# Patient Record
Sex: MALE | Race: BLACK OR AFRICAN AMERICAN | NOT HISPANIC OR LATINO | Employment: UNEMPLOYED | URBAN - METROPOLITAN AREA
[De-identification: names, ages, dates, MRNs, and addresses within clinical notes are randomized per-mention and may not be internally consistent; named-entity substitution may affect disease eponyms.]

---

## 2022-02-07 ENCOUNTER — LAB VISIT (OUTPATIENT)
Dept: LAB | Facility: HOSPITAL | Age: 42
End: 2022-02-07
Attending: FAMILY MEDICINE
Payer: MEDICAID

## 2022-02-07 ENCOUNTER — OFFICE VISIT (OUTPATIENT)
Dept: PRIMARY CARE CLINIC | Facility: CLINIC | Age: 42
End: 2022-02-07
Payer: MEDICAID

## 2022-02-07 VITALS
HEIGHT: 71 IN | BODY MASS INDEX: 22.99 KG/M2 | DIASTOLIC BLOOD PRESSURE: 83 MMHG | HEART RATE: 90 BPM | SYSTOLIC BLOOD PRESSURE: 129 MMHG | WEIGHT: 164.19 LBS | TEMPERATURE: 98 F | OXYGEN SATURATION: 98 %

## 2022-02-07 DIAGNOSIS — R73.03 PREDIABETES: Primary | ICD-10-CM

## 2022-02-07 DIAGNOSIS — F31.9 BIPOLAR I DISORDER WITH DEPRESSION: ICD-10-CM

## 2022-02-07 DIAGNOSIS — Z11.59 ENCOUNTER FOR HEPATITIS C SCREENING TEST FOR LOW RISK PATIENT: ICD-10-CM

## 2022-02-07 DIAGNOSIS — Z11.4 ENCOUNTER FOR SCREENING FOR HIV: ICD-10-CM

## 2022-02-07 DIAGNOSIS — R73.03 PREDIABETES: ICD-10-CM

## 2022-02-07 DIAGNOSIS — Z79.899 ON LONG TERM DRUG THERAPY: ICD-10-CM

## 2022-02-07 DIAGNOSIS — F17.200 NICOTINE DEPENDENCE WITH CURRENT USE: ICD-10-CM

## 2022-02-07 PROCEDURE — 99999 PR PBB SHADOW E&M-NEW PATIENT-LVL III: ICD-10-PCS | Mod: PBBFAC,,, | Performed by: FAMILY MEDICINE

## 2022-02-07 PROCEDURE — 1159F PR MEDICATION LIST DOCUMENTED IN MEDICAL RECORD: ICD-10-PCS | Mod: CPTII,,, | Performed by: FAMILY MEDICINE

## 2022-02-07 PROCEDURE — 3008F PR BODY MASS INDEX (BMI) DOCUMENTED: ICD-10-PCS | Mod: CPTII,,, | Performed by: FAMILY MEDICINE

## 2022-02-07 PROCEDURE — 99203 OFFICE O/P NEW LOW 30 MIN: CPT | Mod: PBBFAC,PN | Performed by: FAMILY MEDICINE

## 2022-02-07 PROCEDURE — 3008F BODY MASS INDEX DOCD: CPT | Mod: CPTII,,, | Performed by: FAMILY MEDICINE

## 2022-02-07 PROCEDURE — 1159F MED LIST DOCD IN RCRD: CPT | Mod: CPTII,,, | Performed by: FAMILY MEDICINE

## 2022-02-07 PROCEDURE — 99204 OFFICE O/P NEW MOD 45 MIN: CPT | Mod: S$PBB,,, | Performed by: FAMILY MEDICINE

## 2022-02-07 PROCEDURE — 3079F PR MOST RECENT DIASTOLIC BLOOD PRESSURE 80-89 MM HG: ICD-10-PCS | Mod: CPTII,,, | Performed by: FAMILY MEDICINE

## 2022-02-07 PROCEDURE — 99204 PR OFFICE/OUTPT VISIT, NEW, LEVL IV, 45-59 MIN: ICD-10-PCS | Mod: S$PBB,,, | Performed by: FAMILY MEDICINE

## 2022-02-07 PROCEDURE — 99999 PR PBB SHADOW E&M-NEW PATIENT-LVL III: CPT | Mod: PBBFAC,,, | Performed by: FAMILY MEDICINE

## 2022-02-07 PROCEDURE — 3079F DIAST BP 80-89 MM HG: CPT | Mod: CPTII,,, | Performed by: FAMILY MEDICINE

## 2022-02-07 PROCEDURE — 3074F PR MOST RECENT SYSTOLIC BLOOD PRESSURE < 130 MM HG: ICD-10-PCS | Mod: CPTII,,, | Performed by: FAMILY MEDICINE

## 2022-02-07 PROCEDURE — 3074F SYST BP LT 130 MM HG: CPT | Mod: CPTII,,, | Performed by: FAMILY MEDICINE

## 2022-02-07 RX ORDER — QUETIAPINE FUMARATE 400 MG/1
400 TABLET, FILM COATED ORAL NIGHTLY
COMMUNITY

## 2022-02-07 NOTE — PATIENT INSTRUCTIONS
Patient Education       Yearly Physical for Adults   About this topic   Most people do not want to be sick. Having a checkup each year with your doctor is one way to help you stay healthy. You may need to see your doctor more or less often. How often you need to go to the doctor depends on your age. Your family and medical history also play a role in how often you need to go to the doctor. Going to see your doctor on a routine basis can help you find problems early or even before they start. This may make it easier to treat or cure your problem.  General   Your doctor will talk about many things during your checkup. Your doctor may ask about:  · Your medical and family history.  · All the drugs you are taking. Be sure to include all prescription, over the counter, and herbal supplements. Tell the doctor if you have any drug allergy. Bring a list of drugs you take with you.  · How you are feeling and if you are having any problems.  · Risky behaviors like smoking, drinking alcohol, using illegal drugs, not wearing seatbelts, having unprotected sex, etc.  Your doctor will do a physical exam and may check your:  · Height and weight  · Blood pressure  · Reflexes  · Memory  · Vision  · Hearing  Your doctor may order:  · Lab tests  · ECG to check your heart rhythm  · X-rays  · Tests or treatments based on your exam  What lifestyle changes are needed?   Your doctor may suggest you make changes to your lifestyle at this visit. The doctor may talk with you about being more active or lowering stress levels. Ask your doctor what you need to do.  What drugs may be needed?   Your doctor may order drugs or vaccines to protect you from illnesses.  What changes to diet are needed?   Talk to your doctor to see if any changes are needed to your diet.  When do I need to call the doctor?   Call your doctor if you need to learn about any test results. Together you can make a plan for more care.  Helpful tips   · Make a list of questions  for your doctor before you go. This will help you remember to ask about any concerns. Write down any answers from your doctor so you can look over them after your visit.   · Tell your doctor about any changes in your body or health since your last visit.  · Ask your doctor about any screening tests you need.  Where can I learn more?   American Academy of Family Physicians  http://familydoctor.org/familydoctor/en/prevention-wellness/staying-healthy/healthy-living/preventive-services-for-healthy-living.printerview.html   Centers for Disease Control  http://www.cdc.gov/family/checkup/   Last Reviewed Date   2019-04-22  Consumer Information Use and Disclaimer   This information is not specific medical advice and does not replace information you receive from your health care provider. This is only a brief summary of general information. It does NOT include all information about conditions, illnesses, injuries, tests, procedures, treatments, therapies, discharge instructions or life-style choices that may apply to you. You must talk with your health care provider for complete information about your health and treatment options. This information should not be used to decide whether or not to accept your health care providers advice, instructions or recommendations. Only your health care provider has the knowledge and training to provide advice that is right for you.  Copyright   Copyright © 2021 UpToDate, Inc. and its affiliates and/or licensors. All rights reserved.  Patient Education       Prediabetes Discharge Instructions   About this topic   When you eat, your body breaks down all sugars and starches into glucose. Your body needs insulin to use glucose for energy. The insulin takes the glucose from your blood into your cells. Your body may not have enough insulin or your body may not use your insulin properly. Then, the glucose or sugar stays in your blood instead of going into your cells. This causes your blood  sugar levels to be too high.  Prediabetes happens when there is too much sugar in your blood. Your blood sugar is higher than normal but is not high enough to be called diabetes.  When you have diabetes, you have too much sugar in your blood and:  · The cells can no longer respond to insulin. Then, sugar enters the cells too slowly. This is how type 2 diabetes develops.  · The pancreas does not make enough insulin. The sugar cannot get into your cells. This is how type 1 diabetes develops.  Prediabetes happens when there is too much sugar in your blood. Your blood sugar is higher than normal but is not high enough to be called diabetes.  Over time, having too much sugar in your blood can cause serious problems. You may not feel sick, but the high sugar can damage your nerves, kidneys, and eyes. You may also have heart problems or stroke. It is important to treat prediabetes. Once you have diabetes, it does not go away.  What care is needed at home?   · Ask your doctor what you need to do when you go home. Make sure you ask questions if you do not understand what the doctor says. This way you will know what you need to do.  · If you are overweight, losing just 10 to 15 pounds (4.5 to 7 kg) can improve your blood sugar.  · Increase your activity level by:  ? Taking a walk each day. Try a brisk walk for 30 minutes.  ? Taking the stairs instead of the elevator.  ? When doing errands, park your car at the back of the parking lot.  · Quit smoking. If you have problems quitting, ask your doctor for help.  · Learn how to check and control your blood sugar and blood pressure. Ask your doctor what blood sugar is normal for you.  What follow-up care is needed?   · Your doctor may ask you to make visits to the office to check on your progress. Be sure to keep these visits.  · Keep a record of your blood sugars and bring it to your doctor's visit.  What drugs may be needed?   The doctor may order drugs to:  · Lower your blood  sugar  · Control high blood pressure  · Control your cholesterol level  What changes to diet are needed?   · Eat healthy foods and change your eating habits. This means you should:  ? Limit the amount of the high fatty or high sugar foods you eat  ? Use olive oil instead of vegetable oil or butter  ? Add fish in your meals 2 to 3 times a week  ? Eat foods high in fiber, like fruits, vegetables, whole grains, and nuts  ? Eat nonstarchy vegetables, like spinach, carrots, broccoli, and green beans  · Drink water instead of sugar-filled drinks like soda, sports drinks, or fruit drinks.  · Limit or avoid beer, wine, and mixed drinks (alcohol).  What problems could happen?   · High levels of acids called ketones build up in the blood. This is a very serious problem.  · Infection  · Injury to blood vessels and nerves  · Diabetes  · Problems with your heart, kidneys, and eyes  · Stroke  What can be done to prevent this health problem?   · Talk with your doctor about changes in your diet and exercises that are right for you.  · Control your weight.  · Know your blood sugar goals. Check your blood sugar levels at regular times of the day. Keep notes on foods and drinks that increase your blood sugar.   · Know the signs of high blood sugar and get help when needed.  When do I need to call the doctor?   · Signs of high blood sugar. These include sleepiness, blurry eyesight, passing urine more often, increased thirst, breath has a fruity sweet smell, upset stomach and throwing up, dizziness, or passing out.  · Signs of fluid loss. These include dark-colored urine or no urine for more than 8 hours, dry mouth and tongue, dry skin, sunken eyes, lack of energy, feeling faint, or passing out.  · Blood sugar levels are high for more than 2 days  · Short of breath  · You are not feeling better in 2 to 3 days you are feeling worse  Teach Back: Helping You Understand   The Teach Back Method helps you understand the information we are  giving you. After you talk with the staff, tell them in your own words what you learned. This helps to make sure the staff has described each thing clearly. It also helps to explain things that may have been confusing. Before going home, make sure you can do these:  · I can tell you about my condition.  · I can tell you what changes I need to make with my diet or activities.  · I can tell you what I will do if I have signs of high blood sugar.  Where can I learn more?   American Academy of Nutrition and Dietetics  https://www.eatright.org/health/diseases-and-conditions/diabetes/what-is-prediabetes   Center for Disease Control and Prevention  https://www.cdc.gov/diabetes/basics/prediabetes.html   National Diabetes Information Clearinghouse  https://www.niddk.nih.gov/health-information/diabetes/overview/what-is-diabetes/prediabetes-insulin-resistance   Last Reviewed Date   2021-04-28  Consumer Information Use and Disclaimer   This information is not specific medical advice and does not replace information you receive from your health care provider. This is only a brief summary of general information. It does NOT include all information about conditions, illnesses, injuries, tests, procedures, treatments, therapies, discharge instructions or life-style choices that may apply to you. You must talk with your health care provider for complete information about your health and treatment options. This information should not be used to decide whether or not to accept your health care providers advice, instructions or recommendations. Only your health care provider has the knowledge and training to provide advice that is right for you.  Copyright   Copyright © 2021 UpToDate, Inc. and its affiliates and/or licensors. All rights reserved.    Maintain  a heathy lifestyle to prevent diabetes.  Choose a diet rich in fruits, vegetables, and low-fat dairy products, but low in meats, sweets, and refined grains   Be more active. If  you are able, walk for 35 mins 5 times a week.

## 2022-02-07 NOTE — PROGRESS NOTES
Subjective:       Patient ID: Man Ochoa is a 42 y.o. male.    Chief Complaint: Establish Care, Abnormal lab      HPI   History of Present Illness:   Man Ochoa 42 y.o. male presents today with   Here to establish care.  [facility]nd [facility]-where his lipids showed LDL of 131 and AIC was 5.9 to follow up with pcp.  Denies any sxs.  He goes there for his Bipolar1 d/o with  Depression, and anxiety.   Knows that he takes Seroquel, and will make the rest of the list available when he gets home.   Smokes 1ppd.     Past Medical History:   Diagnosis Date    Anxiety     Bipolar disorder     Depression      No family history on file.  Social History     Socioeconomic History    Marital status:    Tobacco Use    Smoking status: Current Every Day Smoker    Smokeless tobacco: Never Used   Substance and Sexual Activity    Alcohol use: Never    Drug use: Yes     Types: Marijuana     Outpatient Encounter Medications as of 2/7/2022   Medication Sig Dispense Refill    QUEtiapine (SEROQUEL) 400 MG tablet Take 400 mg by mouth every evening.       No facility-administered encounter medications on file as of 2/7/2022.       Review of Systems   Constitutional: Negative for appetite change and fever.   HENT: Negative for congestion, facial swelling and voice change.    Eyes: Negative for discharge and itching.   Respiratory: Negative for cough, chest tightness and wheezing.    Cardiovascular: Negative.  Negative for chest pain and leg swelling.   Gastrointestinal: Negative for abdominal pain, nausea and vomiting.   Endocrine: Negative for cold intolerance and heat intolerance.   Genitourinary: Negative for dysuria and flank pain.   Musculoskeletal: Negative for myalgias and neck stiffness.   Skin: Negative for pallor and rash.   Neurological: Negative for facial asymmetry and weakness.   Psychiatric/Behavioral: Negative for agitation and confusion.       Objective:     "  /83 (BP Location: Right arm, Patient Position: Sitting, BP Method: Medium (Automatic))   Pulse 90   Temp 98.4 °F (36.9 °C) (Temporal)   Ht 5' 11" (1.803 m)   Wt 74.5 kg (164 lb 3.2 oz)   SpO2 98%   BMI 22.90 kg/m²   Physical Exam  Vitals and nursing note reviewed.   Constitutional:       General: He is not in acute distress.     Appearance: He is well-developed.   HENT:      Head: Normocephalic and atraumatic.      Right Ear: External ear normal.      Left Ear: External ear normal.   Eyes:      Extraocular Movements: EOM normal.      Conjunctiva/sclera: Conjunctivae normal.   Neck:      Thyroid: No thyromegaly.   Cardiovascular:      Rate and Rhythm: Normal rate and regular rhythm.      Heart sounds: Murmur heard.       Pulmonary:      Effort: Pulmonary effort is normal. No respiratory distress.   Abdominal:      General: Bowel sounds are normal. There is no distension.      Palpations: Abdomen is soft.      Tenderness: There is no abdominal tenderness.   Genitourinary:     Comments: deferred  Musculoskeletal:         General: No deformity or edema.      Cervical back: Neck supple.   Lymphadenopathy:      Head:      Right side of head: No submandibular adenopathy.      Left side of head: No submandibular adenopathy.      Cervical: No cervical adenopathy.   Skin:     General: Skin is warm and dry.   Neurological:      Mental Status: He is alert and oriented to person, place, and time.   Psychiatric:         Mood and Affect: Mood and affect normal.         Behavior: Behavior normal.         No results found for this or any previous visit.  Assessment:       1. Prediabetes    2. Bipolar I disorder with depression    3. Encounter for hepatitis C screening test for low risk patient    4. Encounter for screening for HIV    5. Nicotine dependence with current use    6. On long term drug therapy        Plan:   Prediabetes  -     Hemoglobin A1C; Future; Expected date: 08/06/2022    Bipolar I disorder with " depression  -     CBC Auto Differential; Future; Expected date: 02/07/2022  -     Comprehensive Metabolic Panel; Future; Expected date: 02/07/2022    Encounter for hepatitis C screening test for low risk patient  -     Hepatitis C Antibody; Future; Expected date: 02/07/2022    Encounter for screening for HIV  -     HIV 1/2 Ag/Ab (4th Gen); Future; Expected date: 02/07/2022    Nicotine dependence with current use  -     Ambulatory referral/consult to Smoking Cessation Program; Future; Expected date: 02/14/2022    On long term drug therapy  -     TSH; Future; Expected date: 02/07/2022    will try 6 mons of intensive lifestyle modification before initiating med.  High risk for DM due to medication  Diabetic package given to pt today  Maintain a heathy lifestyle.  Choose a diet rich in fruits, vegetables, and low-fat dairy products, but low in meats, sweets, and refined grains   Be more active. If you are able, walk for 35 mins 5 times a week.     Linda Barakat MD

## 2022-02-08 ENCOUNTER — HOSPITAL ENCOUNTER (EMERGENCY)
Facility: HOSPITAL | Age: 42
Discharge: LEFT BEFORE DISCHARGE | End: 2022-02-08
Attending: EMERGENCY MEDICINE
Payer: MEDICAID

## 2022-02-08 ENCOUNTER — TELEPHONE (OUTPATIENT)
Dept: PRIMARY CARE CLINIC | Facility: CLINIC | Age: 42
End: 2022-02-08
Payer: MEDICAID

## 2022-02-08 VITALS
DIASTOLIC BLOOD PRESSURE: 79 MMHG | SYSTOLIC BLOOD PRESSURE: 125 MMHG | WEIGHT: 164.88 LBS | BODY MASS INDEX: 23.08 KG/M2 | OXYGEN SATURATION: 100 % | HEIGHT: 71 IN | HEART RATE: 70 BPM | TEMPERATURE: 98 F | RESPIRATION RATE: 20 BRPM

## 2022-02-08 DIAGNOSIS — Z53.21 ELOPED FROM EMERGENCY DEPARTMENT: ICD-10-CM

## 2022-02-08 DIAGNOSIS — E87.5 SERUM POTASSIUM ELEVATED: Primary | ICD-10-CM

## 2022-02-08 LAB
ALBUMIN SERPL BCP-MCNC: 3.6 G/DL (ref 3.5–5.2)
ALBUMIN SERPL BCP-MCNC: 4.1 G/DL (ref 3.5–5.2)
ALP SERPL-CCNC: 107 U/L (ref 55–135)
ALP SERPL-CCNC: 97 U/L (ref 55–135)
ALT SERPL W/O P-5'-P-CCNC: 16 U/L (ref 10–44)
ALT SERPL W/O P-5'-P-CCNC: 22 U/L (ref 10–44)
ANION GAP SERPL CALC-SCNC: 10 MMOL/L (ref 8–16)
ANION GAP SERPL CALC-SCNC: 4 MMOL/L (ref 8–16)
AST SERPL-CCNC: 13 U/L (ref 10–40)
AST SERPL-CCNC: 18 U/L (ref 10–40)
BASOPHILS # BLD AUTO: 0.04 K/UL (ref 0–0.2)
BASOPHILS # BLD AUTO: 0.05 K/UL (ref 0–0.2)
BASOPHILS NFR BLD: 0.3 % (ref 0–1.9)
BASOPHILS NFR BLD: 0.4 % (ref 0–1.9)
BILIRUB SERPL-MCNC: 0.3 MG/DL (ref 0.1–1)
BILIRUB SERPL-MCNC: 0.4 MG/DL (ref 0.1–1)
BUN SERPL-MCNC: 12 MG/DL (ref 6–20)
BUN SERPL-MCNC: 14 MG/DL (ref 6–20)
CALCIUM SERPL-MCNC: 8.5 MG/DL (ref 8.7–10.5)
CALCIUM SERPL-MCNC: 9.5 MG/DL (ref 8.7–10.5)
CHLORIDE SERPL-SCNC: 104 MMOL/L (ref 95–110)
CHLORIDE SERPL-SCNC: 104 MMOL/L (ref 95–110)
CO2 SERPL-SCNC: 26 MMOL/L (ref 23–29)
CO2 SERPL-SCNC: 29 MMOL/L (ref 23–29)
CREAT SERPL-MCNC: 0.9 MG/DL (ref 0.5–1.4)
CREAT SERPL-MCNC: 0.9 MG/DL (ref 0.5–1.4)
DIFFERENTIAL METHOD: ABNORMAL
DIFFERENTIAL METHOD: NORMAL
EOSINOPHIL # BLD AUTO: 0.1 K/UL (ref 0–0.5)
EOSINOPHIL # BLD AUTO: 0.3 K/UL (ref 0–0.5)
EOSINOPHIL NFR BLD: 0.6 % (ref 0–8)
EOSINOPHIL NFR BLD: 2.2 % (ref 0–8)
ERYTHROCYTE [DISTWIDTH] IN BLOOD BY AUTOMATED COUNT: 13.2 % (ref 11.5–14.5)
ERYTHROCYTE [DISTWIDTH] IN BLOOD BY AUTOMATED COUNT: 13.2 % (ref 11.5–14.5)
EST. GFR  (AFRICAN AMERICAN): >60 ML/MIN/1.73 M^2
EST. GFR  (AFRICAN AMERICAN): >60 ML/MIN/1.73 M^2
EST. GFR  (NON AFRICAN AMERICAN): >60 ML/MIN/1.73 M^2
EST. GFR  (NON AFRICAN AMERICAN): >60 ML/MIN/1.73 M^2
ESTIMATED AVG GLUCOSE: 111 MG/DL (ref 68–131)
GLUCOSE SERPL-MCNC: 57 MG/DL (ref 70–110)
GLUCOSE SERPL-MCNC: 86 MG/DL (ref 70–110)
HBA1C MFR BLD: 5.5 % (ref 4–5.6)
HCT VFR BLD AUTO: 40.3 % (ref 40–54)
HCT VFR BLD AUTO: 44.6 % (ref 40–54)
HCV AB SERPL QL IA: NEGATIVE
HGB BLD-MCNC: 13.3 G/DL (ref 14–18)
HGB BLD-MCNC: 14.5 G/DL (ref 14–18)
HIV 1+2 AB+HIV1 P24 AG SERPL QL IA: NEGATIVE
IMM GRANULOCYTES # BLD AUTO: 0.03 K/UL (ref 0–0.04)
IMM GRANULOCYTES # BLD AUTO: 0.04 K/UL (ref 0–0.04)
IMM GRANULOCYTES NFR BLD AUTO: 0.2 % (ref 0–0.5)
IMM GRANULOCYTES NFR BLD AUTO: 0.4 % (ref 0–0.5)
LYMPHOCYTES # BLD AUTO: 3.4 K/UL (ref 1–4.8)
LYMPHOCYTES # BLD AUTO: 4.6 K/UL (ref 1–4.8)
LYMPHOCYTES NFR BLD: 29.6 % (ref 18–48)
LYMPHOCYTES NFR BLD: 38.2 % (ref 18–48)
MCH RBC QN AUTO: 29.8 PG (ref 27–31)
MCH RBC QN AUTO: 29.8 PG (ref 27–31)
MCHC RBC AUTO-ENTMCNC: 32.5 G/DL (ref 32–36)
MCHC RBC AUTO-ENTMCNC: 33 G/DL (ref 32–36)
MCV RBC AUTO: 90 FL (ref 82–98)
MCV RBC AUTO: 92 FL (ref 82–98)
MONOCYTES # BLD AUTO: 0.6 K/UL (ref 0.3–1)
MONOCYTES # BLD AUTO: 0.8 K/UL (ref 0.3–1)
MONOCYTES NFR BLD: 5.5 % (ref 4–15)
MONOCYTES NFR BLD: 7 % (ref 4–15)
NEUTROPHILS # BLD AUTO: 6.3 K/UL (ref 1.8–7.7)
NEUTROPHILS # BLD AUTO: 7.2 K/UL (ref 1.8–7.7)
NEUTROPHILS NFR BLD: 52.1 % (ref 38–73)
NEUTROPHILS NFR BLD: 63.5 % (ref 38–73)
NRBC BLD-RTO: 0 /100 WBC
NRBC BLD-RTO: 0 /100 WBC
PLATELET # BLD AUTO: 286 K/UL (ref 150–450)
PLATELET # BLD AUTO: 327 K/UL (ref 150–450)
PMV BLD AUTO: 8.6 FL (ref 9.2–12.9)
PMV BLD AUTO: 9.9 FL (ref 9.2–12.9)
POTASSIUM SERPL-SCNC: 3.7 MMOL/L (ref 3.5–5.1)
POTASSIUM SERPL-SCNC: 7.9 MMOL/L (ref 3.5–5.1)
PROT SERPL-MCNC: 6.6 G/DL (ref 6–8.4)
PROT SERPL-MCNC: 8.1 G/DL (ref 6–8.4)
RBC # BLD AUTO: 4.46 M/UL (ref 4.6–6.2)
RBC # BLD AUTO: 4.87 M/UL (ref 4.6–6.2)
SODIUM SERPL-SCNC: 137 MMOL/L (ref 136–145)
SODIUM SERPL-SCNC: 140 MMOL/L (ref 136–145)
T4 FREE SERPL-MCNC: 1 NG/DL (ref 0.71–1.51)
TSH SERPL DL<=0.005 MIU/L-ACNC: 0.24 UIU/ML (ref 0.4–4)
WBC # BLD AUTO: 11.4 K/UL (ref 3.9–12.7)
WBC # BLD AUTO: 12.04 K/UL (ref 3.9–12.7)

## 2022-02-08 PROCEDURE — 36415 COLL VENOUS BLD VENIPUNCTURE: CPT | Mod: PN | Performed by: FAMILY MEDICINE

## 2022-02-08 PROCEDURE — 80053 COMPREHEN METABOLIC PANEL: CPT | Performed by: FAMILY MEDICINE

## 2022-02-08 PROCEDURE — 93010 EKG 12-LEAD: ICD-10-PCS | Mod: ,,, | Performed by: INTERNAL MEDICINE

## 2022-02-08 PROCEDURE — 99284 EMERGENCY DEPT VISIT MOD MDM: CPT | Mod: 25

## 2022-02-08 PROCEDURE — 83036 HEMOGLOBIN GLYCOSYLATED A1C: CPT | Performed by: FAMILY MEDICINE

## 2022-02-08 PROCEDURE — 84443 ASSAY THYROID STIM HORMONE: CPT | Performed by: FAMILY MEDICINE

## 2022-02-08 PROCEDURE — 80053 COMPREHEN METABOLIC PANEL: CPT | Mod: 91 | Performed by: NURSE PRACTITIONER

## 2022-02-08 PROCEDURE — 87389 HIV-1 AG W/HIV-1&-2 AB AG IA: CPT | Performed by: FAMILY MEDICINE

## 2022-02-08 PROCEDURE — 93010 ELECTROCARDIOGRAM REPORT: CPT | Mod: ,,, | Performed by: INTERNAL MEDICINE

## 2022-02-08 PROCEDURE — 86803 HEPATITIS C AB TEST: CPT | Performed by: FAMILY MEDICINE

## 2022-02-08 PROCEDURE — 85025 COMPLETE CBC W/AUTO DIFF WBC: CPT | Performed by: FAMILY MEDICINE

## 2022-02-08 PROCEDURE — 85025 COMPLETE CBC W/AUTO DIFF WBC: CPT | Mod: 91 | Performed by: NURSE PRACTITIONER

## 2022-02-08 PROCEDURE — 84439 ASSAY OF FREE THYROXINE: CPT | Performed by: FAMILY MEDICINE

## 2022-02-08 PROCEDURE — 93005 ELECTROCARDIOGRAM TRACING: CPT

## 2022-02-08 NOTE — FIRST PROVIDER EVALUATION
Medical screening exam completed.  I have conducted a focused provider triage encounter, findings are as follows:    Brief history of present illness:  Pt sent to the Er for elevated K+    There were no vitals filed for this visit.      Preliminary workup initiated; this workup will be continued and followed by the physician or advanced practice provider that is assigned to the patient when roomed.

## 2022-02-08 NOTE — TELEPHONE ENCOUNTER
Patient returned call to Nurse after lab called and reported a critical Potassium Lab 7.9. Patient was called and encouraged to report to ED immediately. Patient stated that he was reporting to [facility].  ----- Message from aMy Joe sent at 2/8/2022  1:05 PM CST -----  Contact: self [phone]  Patient is returning a phone call.  Who left a message for the patient: nurse  Does patient know what this is regarding:  call earlier  Would you like a call back, or a response through your [facility] portal?:   phone  Comments:

## 2022-02-09 ENCOUNTER — TELEPHONE (OUTPATIENT)
Dept: PRIMARY CARE CLINIC | Facility: CLINIC | Age: 42
End: 2022-02-09
Payer: MEDICAID

## 2022-02-09 NOTE — PROGRESS NOTES
Critical level due to hemolysis. Repeat lab was normal.  If he does not have a follow up appt, please make him one.

## 2022-02-09 NOTE — ED PROVIDER NOTES
"     HISTORY     Chief Complaint   Patient presents with    Abnormal Lab     PT states, "The doctor called and said to come to the ER for my high blood level."     Review of patient's allergies indicates:  No Known Allergies     HPI   Reports he was sent to the ER by his docotor for elevated K+. Pt denies any and all complaints     The history is provided by the patient. No  was used.        PCP: Linda Barakat MD     Past Medical History:  Past Medical History:   Diagnosis Date    Anxiety     Bipolar disorder     Depression         Past Surgical History:  No past surgical history on file.     Family History:  No family history on file.     Social History:  Social History     Tobacco Use    Smoking status: Current Every Day Smoker    Smokeless tobacco: Never Used   Substance and Sexual Activity    Alcohol use: Never    Drug use: Yes     Types: Marijuana    Sexual activity: Not on file         ROS   Review of Systems   Constitutional: Negative for fever.   HENT: Negative for sore throat.    Respiratory: Negative for shortness of breath.    Cardiovascular: Negative for chest pain.   Gastrointestinal: Negative for nausea.   Genitourinary: Negative for dysuria.   Musculoskeletal: Negative for back pain.   Skin: Negative for rash.   Neurological: Negative for weakness.   Hematological: Does not bruise/bleed easily.       PHYSICAL EXAM     Initial Vitals [02/08/22 1748]   BP Pulse Resp Temp SpO2   125/79 70 20 98.3 °F (36.8 °C) 100 %      MAP       --           Physical Exam    Nursing note and vitals reviewed.  Constitutional: He appears well-developed and well-nourished. He is not diaphoretic. No distress.   HENT:   Head: Normocephalic and atraumatic.   Eyes: Right eye exhibits no discharge. Left eye exhibits no discharge.   Neck: Neck supple.   Normal range of motion.  Cardiovascular: Normal rate.   Pulmonary/Chest: No respiratory distress.   Abdominal: Abdomen is soft. He exhibits no " "distension. There is no abdominal tenderness.   Musculoskeletal:         General: Normal range of motion.      Cervical back: Normal range of motion and neck supple.     Neurological: He is alert and oriented to person, place, and time. He has normal strength.   Skin: Skin is warm and dry.          ED COURSE   Procedures  ED ONGOING VITALS:  Vitals:    02/08/22 1748   BP: 125/79   Pulse: 70   Resp: 20   Temp: 98.3 °F (36.8 °C)   TempSrc: Oral   SpO2: 100%   Weight: 74.8 kg (164 lb 14.5 oz)   Height: 5' 11" (1.803 m)         ABNORMAL LAB VALUES:  Labs Reviewed   CBC W/ AUTO DIFFERENTIAL - Abnormal; Notable for the following components:       Result Value    RBC 4.46 (*)     Hemoglobin 13.3 (*)     MPV 8.6 (*)     All other components within normal limits   COMPREHENSIVE METABOLIC PANEL - Abnormal; Notable for the following components:    Calcium 8.5 (*)     All other components within normal limits         ALL LAB VALUES:  Results for orders placed or performed during the hospital encounter of 02/08/22   CBC auto differential   Result Value Ref Range    WBC 12.04 3.90 - 12.70 K/uL    RBC 4.46 (L) 4.60 - 6.20 M/uL    Hemoglobin 13.3 (L) 14.0 - 18.0 g/dL    Hematocrit 40.3 40.0 - 54.0 %    MCV 90 82 - 98 fL    MCH 29.8 27.0 - 31.0 pg    MCHC 33.0 32.0 - 36.0 g/dL    RDW 13.2 11.5 - 14.5 %    Platelets 286 150 - 450 K/uL    MPV 8.6 (L) 9.2 - 12.9 fL    Immature Granulocytes 0.2 0.0 - 0.5 %    Gran # (ANC) 6.3 1.8 - 7.7 K/uL    Immature Grans (Abs) 0.03 0.00 - 0.04 K/uL    Lymph # 4.6 1.0 - 4.8 K/uL    Mono # 0.8 0.3 - 1.0 K/uL    Eos # 0.3 0.0 - 0.5 K/uL    Baso # 0.04 0.00 - 0.20 K/uL    nRBC 0 0 /100 WBC    Gran % 52.1 38.0 - 73.0 %    Lymph % 38.2 18.0 - 48.0 %    Mono % 7.0 4.0 - 15.0 %    Eosinophil % 2.2 0.0 - 8.0 %    Basophil % 0.3 0.0 - 1.9 %    Differential Method Automated    Comprehensive metabolic panel   Result Value Ref Range    Sodium 140 136 - 145 mmol/L    Potassium 3.7 3.5 - 5.1 mmol/L    Chloride 104 " 95 - 110 mmol/L    CO2 26 23 - 29 mmol/L    Glucose 86 70 - 110 mg/dL    BUN 14 6 - 20 mg/dL    Creatinine 0.9 0.5 - 1.4 mg/dL    Calcium 8.5 (L) 8.7 - 10.5 mg/dL    Total Protein 6.6 6.0 - 8.4 g/dL    Albumin 3.6 3.5 - 5.2 g/dL    Total Bilirubin 0.3 0.1 - 1.0 mg/dL    Alkaline Phosphatase 97 55 - 135 U/L    AST 13 10 - 40 U/L    ALT 16 10 - 44 U/L    Anion Gap 10 8 - 16 mmol/L    eGFR if African American >60 >60 mL/min/1.73 m^2    eGFR if non African American >60 >60 mL/min/1.73 m^2           RADIOLOGY STUDIES:  Imaging Results    None                   The above vital signs and test results have been reviewed by the emergency provider.     ED Medications:  Current Discharge Medication List        Discharge Medications:  New Prescriptions    No medications on file       Follow-up Information     Linda Barakat MD.    Specialty: Family Medicine  Why: As needed  Contact information:  [address] [facility]  [address] [place] [id number] [phone]             [facility]    Specialty: Emergency Medicine  Why: If symptoms worsen  Contact information:  [address] [facility] [address] [phone]                      8:27 PM  Pt has eloped from the ER    MEDICAL DECISION MAKING                 CLINICAL IMPRESSION       ICD-10-CM ICD-9-CM   1. Serum potassium elevated  E87.5 276.7   2. Eloped from emergency department  Z53.21 V64.2       Disposition:   Disposition: Eloped         Jose Lorenzo NP  02/08/22 2030

## 2022-02-09 NOTE — TELEPHONE ENCOUNTER
Patient was advised to report to ED due to critical Potassium level on yesterday per wife he went to [facility] and left due to long wait patient has called multiple time asking if he can go to [facility] was advised he can go to any ED he chooses ----- Message from Roseline Laurent sent at 2/9/2022  2:07 PM CST -----  Contact: [phone] Malorie(wife)  Pt wife is requesting a call back from the nurse about the pt going to the ER on 02/08/22. Pt wife is asking if the pt can be seen at [facility] and why the pt was not prescribed any medication. Pt wife states she left a message earlier with no call back. Pt wife was advised the doctor is in clinic with pts and she would receive a call back by the end of the day.

## 2022-09-15 ENCOUNTER — TELEPHONE (OUTPATIENT)
Dept: PRIMARY CARE CLINIC | Facility: CLINIC | Age: 42
End: 2022-09-15
Payer: MEDICAID

## 2022-09-15 NOTE — TELEPHONE ENCOUNTER
Returned call follow hospital appointment scheduled. Spoke to wife she voiced understanding of appointment date time and location.

## 2022-09-29 ENCOUNTER — OFFICE VISIT (OUTPATIENT)
Dept: PRIMARY CARE CLINIC | Facility: CLINIC | Age: 42
End: 2022-09-29
Payer: MEDICAID

## 2022-09-29 VITALS
DIASTOLIC BLOOD PRESSURE: 76 MMHG | HEART RATE: 91 BPM | TEMPERATURE: 98 F | BODY MASS INDEX: 25.17 KG/M2 | HEIGHT: 71 IN | SYSTOLIC BLOOD PRESSURE: 132 MMHG | OXYGEN SATURATION: 97 % | WEIGHT: 179.81 LBS

## 2022-09-29 DIAGNOSIS — R73.03 PREDIABETES: Primary | ICD-10-CM

## 2022-09-29 DIAGNOSIS — R60.0 EDEMA OF BOTH FEET: ICD-10-CM

## 2022-09-29 DIAGNOSIS — F17.200 NICOTINE DEPENDENCE WITH CURRENT USE: ICD-10-CM

## 2022-09-29 DIAGNOSIS — F31.9 BIPOLAR I DISORDER WITH DEPRESSION: ICD-10-CM

## 2022-09-29 PROCEDURE — 99999 PR PBB SHADOW E&M-EST. PATIENT-LVL III: CPT | Mod: PBBFAC,,, | Performed by: FAMILY MEDICINE

## 2022-09-29 PROCEDURE — 99999 PR PBB SHADOW E&M-EST. PATIENT-LVL III: ICD-10-PCS | Mod: PBBFAC,,, | Performed by: FAMILY MEDICINE

## 2022-09-29 PROCEDURE — 3078F DIAST BP <80 MM HG: CPT | Mod: CPTII,,, | Performed by: FAMILY MEDICINE

## 2022-09-29 PROCEDURE — 3044F PR MOST RECENT HEMOGLOBIN A1C LEVEL <7.0%: ICD-10-PCS | Mod: CPTII,,, | Performed by: FAMILY MEDICINE

## 2022-09-29 PROCEDURE — 3008F BODY MASS INDEX DOCD: CPT | Mod: CPTII,,, | Performed by: FAMILY MEDICINE

## 2022-09-29 PROCEDURE — 3044F HG A1C LEVEL LT 7.0%: CPT | Mod: CPTII,,, | Performed by: FAMILY MEDICINE

## 2022-09-29 PROCEDURE — 3075F SYST BP GE 130 - 139MM HG: CPT | Mod: CPTII,,, | Performed by: FAMILY MEDICINE

## 2022-09-29 PROCEDURE — 99213 PR OFFICE/OUTPT VISIT, EST, LEVL III, 20-29 MIN: ICD-10-PCS | Mod: S$PBB,,, | Performed by: FAMILY MEDICINE

## 2022-09-29 PROCEDURE — 99213 OFFICE O/P EST LOW 20 MIN: CPT | Mod: S$PBB,,, | Performed by: FAMILY MEDICINE

## 2022-09-29 PROCEDURE — 1159F MED LIST DOCD IN RCRD: CPT | Mod: CPTII,,, | Performed by: FAMILY MEDICINE

## 2022-09-29 PROCEDURE — 99213 OFFICE O/P EST LOW 20 MIN: CPT | Mod: PBBFAC,PN | Performed by: FAMILY MEDICINE

## 2022-09-29 PROCEDURE — 3008F PR BODY MASS INDEX (BMI) DOCUMENTED: ICD-10-PCS | Mod: CPTII,,, | Performed by: FAMILY MEDICINE

## 2022-09-29 PROCEDURE — 1159F PR MEDICATION LIST DOCUMENTED IN MEDICAL RECORD: ICD-10-PCS | Mod: CPTII,,, | Performed by: FAMILY MEDICINE

## 2022-09-29 PROCEDURE — 3078F PR MOST RECENT DIASTOLIC BLOOD PRESSURE < 80 MM HG: ICD-10-PCS | Mod: CPTII,,, | Performed by: FAMILY MEDICINE

## 2022-09-29 PROCEDURE — 3075F PR MOST RECENT SYSTOLIC BLOOD PRESS GE 130-139MM HG: ICD-10-PCS | Mod: CPTII,,, | Performed by: FAMILY MEDICINE

## 2022-09-29 NOTE — PROGRESS NOTES
Subjective:       Patient ID: Man Ochoa is a 42 y.o. male.    Chief Complaint: No chief complaint on file.      HPI   History of Present Illness:   Man Ochoa 42 y.o. male presents today with  Here for routine 6 mons follow up. No complaints  Was in hospital for bilateral feet edema which has resolved.  On Seroquel for insomnia and it is helping.    Past Medical History:   Diagnosis Date    Anxiety     Bipolar disorder     Depression      No family history on file.  Social History     Socioeconomic History    Marital status:    Tobacco Use    Smoking status: Every Day    Smokeless tobacco: Never   Substance and Sexual Activity    Alcohol use: Never    Drug use: Yes     Types: Marijuana     Outpatient Encounter Medications as of 9/29/2022   Medication Sig Dispense Refill    QUEtiapine (SEROQUEL) 400 MG tablet Take 400 mg by mouth every evening.       No facility-administered encounter medications on file as of 9/29/2022.       Review of Systems    Review of Systems      A complete 10 point ROS was completed and are positive as per above HPI.    Otherwise negative for fever, diplopia, chest pain, shortness of breath, vomiting, blood in urine, joint pain, skin rash, seizures and unusual bleeding.       Objective:      There were no vitals taken for this visit.  Physical Exam    CONSTITUTIONAL: No apparent distress. Appears comfortable. Does not appear acutely ill or septic. Appears adequately hydrated.  CARDIOVASCULAR: No perioral cyanosis  PULMONARY: Breathing unlabored. No retractions Chest expansion grossly normal.  PSYCHIATRIC: Alert and conversant and grossly oriented. Mood is grossly neutral. Affect appropriate. Judgment and insight grossly intact.  NEUROLOGIC: No focal sensory deficits reported.     Results for orders placed or performed during the hospital encounter of 02/08/22   CBC auto differential   Result Value Ref Range    WBC 12.04 3.90 - 12.70 K/uL    RBC 4.46 (L) 4.60 -  6.20 M/uL    Hemoglobin 13.3 (L) 14.0 - 18.0 g/dL    Hematocrit 40.3 40.0 - 54.0 %    MCV 90 82 - 98 fL    MCH 29.8 27.0 - 31.0 pg    MCHC 33.0 32.0 - 36.0 g/dL    RDW 13.2 11.5 - 14.5 %    Platelets 286 150 - 450 K/uL    MPV 8.6 (L) 9.2 - 12.9 fL    Immature Granulocytes 0.2 0.0 - 0.5 %    Gran # (ANC) 6.3 1.8 - 7.7 K/uL    Immature Grans (Abs) 0.03 0.00 - 0.04 K/uL    Lymph # 4.6 1.0 - 4.8 K/uL    Mono # 0.8 0.3 - 1.0 K/uL    Eos # 0.3 0.0 - 0.5 K/uL    Baso # 0.04 0.00 - 0.20 K/uL    nRBC 0 0 /100 WBC    Gran % 52.1 38.0 - 73.0 %    Lymph % 38.2 18.0 - 48.0 %    Mono % 7.0 4.0 - 15.0 %    Eosinophil % 2.2 0.0 - 8.0 %    Basophil % 0.3 0.0 - 1.9 %    Differential Method Automated    Comprehensive metabolic panel   Result Value Ref Range    Sodium 140 136 - 145 mmol/L    Potassium 3.7 3.5 - 5.1 mmol/L    Chloride 104 95 - 110 mmol/L    CO2 26 23 - 29 mmol/L    Glucose 86 70 - 110 mg/dL    BUN 14 6 - 20 mg/dL    Creatinine 0.9 0.5 - 1.4 mg/dL    Calcium 8.5 (L) 8.7 - 10.5 mg/dL    Total Protein 6.6 6.0 - 8.4 g/dL    Albumin 3.6 3.5 - 5.2 g/dL    Total Bilirubin 0.3 0.1 - 1.0 mg/dL    Alkaline Phosphatase 97 55 - 135 U/L    AST 13 10 - 40 U/L    ALT 16 10 - 44 U/L    Anion Gap 10 8 - 16 mmol/L    eGFR if African American >60 >60 mL/min/1.73 m^2    eGFR if non African American >60 >60 mL/min/1.73 m^2     Assessment:       No diagnosis found.    Plan:   There are no diagnoses linked to this encounter.    Linda Barakat MD

## 2022-09-29 NOTE — PROGRESS NOTES
"Subjective:       Patient ID: Man Ochoa is a 42 y.o. male.    Chief Complaint: follow up er visit      HPI   History of Present Illness:   Man Ochoa 42 y.o. male presents today with  Reported that he just got out of work, very sleepy and  wants to let me know that he is doing good and the swelling has resolved completely. Declined exam and will come back for follow up.  Was at the [facility] recently for feet edema.   Chronic Condition, Stable, latest lab result reviewed, medications reviewed-no side effects, counseled to take all meds as prescribed. No change in medication today, continue current regimen.      Past Medical History:   Diagnosis Date    Anxiety     Bipolar disorder     Depression      No family history on file.  Social History     Socioeconomic History    Marital status:    Tobacco Use    Smoking status: Every Day    Smokeless tobacco: Never   Substance and Sexual Activity    Alcohol use: Never    Drug use: Yes     Types: Marijuana     Outpatient Encounter Medications as of 9/29/2022   Medication Sig Dispense Refill    QUEtiapine (SEROQUEL) 400 MG tablet Take 400 mg by mouth every evening.       No facility-administered encounter medications on file as of 9/29/2022.       Review of Systems    Review of Systems      A complete 10 point ROS was completed and are positive as per above HPI.    Otherwise negative for fever, diplopia, chest pain, shortness of breath, vomiting, blood in urine, joint pain, skin rash, seizures and unusual bleeding.       Objective:      /76 (BP Location: Left arm, Patient Position: Sitting, BP Method: Medium (Manual))   Pulse 91   Temp 98 °F (36.7 °C) (Temporal)   Ht 5' 11" (1.803 m)   Wt 81.6 kg (179 lb 12.8 oz)   SpO2 97%   BMI 25.08 kg/m²   Physical Exam    CONSTITUTIONAL: No apparent distress. Appears comfortable. Does not appear acutely ill or septic. Appears adequately hydrated.  CARDIOVASCULAR: No perioral " cyanosis  PULMONARY: Breathing unlabored. No retractions Chest expansion grossly normal.  PSYCHIATRIC: Alert and conversant and grossly oriented. Mood is grossly neutral. Affect appropriate. Judgment and insight grossly intact.  NEUROLOGIC: No focal sensory deficits reported.     Results for orders placed or performed during the hospital encounter of 02/08/22   CBC auto differential   Result Value Ref Range    WBC 12.04 3.90 - 12.70 K/uL    RBC 4.46 (L) 4.60 - 6.20 M/uL    Hemoglobin 13.3 (L) 14.0 - 18.0 g/dL    Hematocrit 40.3 40.0 - 54.0 %    MCV 90 82 - 98 fL    MCH 29.8 27.0 - 31.0 pg    MCHC 33.0 32.0 - 36.0 g/dL    RDW 13.2 11.5 - 14.5 %    Platelets 286 150 - 450 K/uL    MPV 8.6 (L) 9.2 - 12.9 fL    Immature Granulocytes 0.2 0.0 - 0.5 %    Gran # (ANC) 6.3 1.8 - 7.7 K/uL    Immature Grans (Abs) 0.03 0.00 - 0.04 K/uL    Lymph # 4.6 1.0 - 4.8 K/uL    Mono # 0.8 0.3 - 1.0 K/uL    Eos # 0.3 0.0 - 0.5 K/uL    Baso # 0.04 0.00 - 0.20 K/uL    nRBC 0 0 /100 WBC    Gran % 52.1 38.0 - 73.0 %    Lymph % 38.2 18.0 - 48.0 %    Mono % 7.0 4.0 - 15.0 %    Eosinophil % 2.2 0.0 - 8.0 %    Basophil % 0.3 0.0 - 1.9 %    Differential Method Automated    Comprehensive metabolic panel   Result Value Ref Range    Sodium 140 136 - 145 mmol/L    Potassium 3.7 3.5 - 5.1 mmol/L    Chloride 104 95 - 110 mmol/L    CO2 26 23 - 29 mmol/L    Glucose 86 70 - 110 mg/dL    BUN 14 6 - 20 mg/dL    Creatinine 0.9 0.5 - 1.4 mg/dL    Calcium 8.5 (L) 8.7 - 10.5 mg/dL    Total Protein 6.6 6.0 - 8.4 g/dL    Albumin 3.6 3.5 - 5.2 g/dL    Total Bilirubin 0.3 0.1 - 1.0 mg/dL    Alkaline Phosphatase 97 55 - 135 U/L    AST 13 10 - 40 U/L    ALT 16 10 - 44 U/L    Anion Gap 10 8 - 16 mmol/L    eGFR if African American >60 >60 mL/min/1.73 m^2    eGFR if non African American >60 >60 mL/min/1.73 m^2     Assessment:       No diagnosis found.    Plan:   There are no diagnoses linked to this encounter.      Prediabetes    Bipolar I disorder with  depression    Nicotine dependence with current use    Edema of both feet  Comments:  resolved   Chronic Condition, Stable, latest lab result reviewed, medications reviewed-no side effects, counseled to take all meds as prescribed. No change in medication today, continue current regimen.     Linda Barakat MD